# Patient Record
Sex: MALE | Race: WHITE
[De-identification: names, ages, dates, MRNs, and addresses within clinical notes are randomized per-mention and may not be internally consistent; named-entity substitution may affect disease eponyms.]

---

## 2018-10-01 ENCOUNTER — HOSPITAL ENCOUNTER (EMERGENCY)
Dept: HOSPITAL 62 - ER | Age: 63
Discharge: HOME | End: 2018-10-01
Payer: MEDICARE

## 2018-10-01 VITALS — DIASTOLIC BLOOD PRESSURE: 73 MMHG | SYSTOLIC BLOOD PRESSURE: 137 MMHG

## 2018-10-01 DIAGNOSIS — Z88.5: ICD-10-CM

## 2018-10-01 DIAGNOSIS — N13.2: Primary | ICD-10-CM

## 2018-10-01 DIAGNOSIS — R31.9: ICD-10-CM

## 2018-10-01 DIAGNOSIS — I10: ICD-10-CM

## 2018-10-01 DIAGNOSIS — R11.0: ICD-10-CM

## 2018-10-01 LAB
ADD MANUAL DIFF: NO
ALBUMIN SERPL-MCNC: 4.4 G/DL (ref 3.5–5)
ALP SERPL-CCNC: 102 U/L (ref 38–126)
ALT SERPL-CCNC: 31 U/L (ref 21–72)
ANION GAP SERPL CALC-SCNC: 11 MMOL/L (ref 5–19)
APPEARANCE UR: (no result)
APTT PPP: YELLOW S
AST SERPL-CCNC: 25 U/L (ref 17–59)
BASOPHILS # BLD AUTO: 0.1 10^3/UL (ref 0–0.2)
BASOPHILS NFR BLD AUTO: 0.5 % (ref 0–2)
BILIRUB DIRECT SERPL-MCNC: 0.5 MG/DL (ref 0–0.4)
BILIRUB SERPL-MCNC: 0.8 MG/DL (ref 0.2–1.3)
BILIRUB UR QL STRIP: NEGATIVE
BUN SERPL-MCNC: 19 MG/DL (ref 7–20)
CALCIUM: 9.9 MG/DL (ref 8.4–10.2)
CHLORIDE SERPL-SCNC: 105 MMOL/L (ref 98–107)
CO2 SERPL-SCNC: 23 MMOL/L (ref 22–30)
EOSINOPHIL # BLD AUTO: 0 10^3/UL (ref 0–0.6)
EOSINOPHIL NFR BLD AUTO: 0.5 % (ref 0–6)
ERYTHROCYTE [DISTWIDTH] IN BLOOD BY AUTOMATED COUNT: 14.3 % (ref 11.5–14)
GLUCOSE SERPL-MCNC: 244 MG/DL (ref 75–110)
GLUCOSE UR STRIP-MCNC: 150 MG/DL
HCT VFR BLD CALC: 43.8 % (ref 37.9–51)
HGB BLD-MCNC: 14.7 G/DL (ref 13.5–17)
KETONES UR STRIP-MCNC: 80 MG/DL
LIPASE SERPL-CCNC: 64.3 U/L (ref 23–300)
LYMPHOCYTES # BLD AUTO: 1 10^3/UL (ref 0.5–4.7)
LYMPHOCYTES NFR BLD AUTO: 10.3 % (ref 13–45)
MCH RBC QN AUTO: 27.3 PG (ref 27–33.4)
MCHC RBC AUTO-ENTMCNC: 33.4 G/DL (ref 32–36)
MCV RBC AUTO: 82 FL (ref 80–97)
MONOCYTES # BLD AUTO: 0.5 10^3/UL (ref 0.1–1.4)
MONOCYTES NFR BLD AUTO: 5.1 % (ref 3–13)
NEUTROPHILS # BLD AUTO: 8.5 10^3/UL (ref 1.7–8.2)
NEUTS SEG NFR BLD AUTO: 83.6 % (ref 42–78)
NITRITE UR QL STRIP: NEGATIVE
PH UR STRIP: 6 [PH] (ref 5–9)
PLATELET # BLD: 168 10^3/UL (ref 150–450)
POTASSIUM SERPL-SCNC: 4.6 MMOL/L (ref 3.6–5)
PROT SERPL-MCNC: 7.3 G/DL (ref 6.3–8.2)
PROT UR STRIP-MCNC: 30 MG/DL
RBC # BLD AUTO: 5.37 10^6/UL (ref 4.35–5.55)
SODIUM SERPL-SCNC: 139.3 MMOL/L (ref 137–145)
SP GR UR STRIP: 1.02
TOTAL CELLS COUNTED % (AUTO): 100 %
UROBILINOGEN UR-MCNC: NEGATIVE MG/DL (ref ?–2)
WBC # BLD AUTO: 10.2 10^3/UL (ref 4–10.5)

## 2018-10-01 PROCEDURE — 96375 TX/PRO/DX INJ NEW DRUG ADDON: CPT

## 2018-10-01 PROCEDURE — 87086 URINE CULTURE/COLONY COUNT: CPT

## 2018-10-01 PROCEDURE — 81001 URINALYSIS AUTO W/SCOPE: CPT

## 2018-10-01 PROCEDURE — 80053 COMPREHEN METABOLIC PANEL: CPT

## 2018-10-01 PROCEDURE — 76380 CAT SCAN FOLLOW-UP STUDY: CPT

## 2018-10-01 PROCEDURE — 99284 EMERGENCY DEPT VISIT MOD MDM: CPT

## 2018-10-01 PROCEDURE — 83690 ASSAY OF LIPASE: CPT

## 2018-10-01 PROCEDURE — 96374 THER/PROPH/DIAG INJ IV PUSH: CPT

## 2018-10-01 PROCEDURE — 36415 COLL VENOUS BLD VENIPUNCTURE: CPT

## 2018-10-01 PROCEDURE — 85025 COMPLETE CBC W/AUTO DIFF WBC: CPT

## 2018-10-01 PROCEDURE — 96361 HYDRATE IV INFUSION ADD-ON: CPT

## 2018-10-01 PROCEDURE — 96376 TX/PRO/DX INJ SAME DRUG ADON: CPT

## 2018-10-01 NOTE — ER DOCUMENT REPORT
ED Medical Screen (RME)





- General


Chief Complaint: Possible Kidney Stone


Stated Complaint: LEFT SIDE FLANK PAIN


Time Seen by Provider: 10/01/18 01:33


Mode of Arrival: Wheelchair


Information source: Patient


Notes: 





Patient is a 63-year-old male who presents with chief complaint of sudden onset 

flank pain.  Patient reports the pain is located on the left side, started in 

his back and radiated to the left lower quadrant.  Patient reports history of 

kidney stones approximately 20 years ago, states this feels very similar.  

Patient reports associated blood in his urine with mild nausea.





Exam:


Tenderness to palpation to left lower quadrant and left flank.





I have greeted and performed a rapid initial assessment of this patient.  A 

comprehensive ED assessment and evaluation of the patient, analysis of test 

results and completion of the medical decision making process will be conducted 

by additional ED providers.  Dictation of this chart was performed using voice 

recognition software; therefore, there may be some unintended grammatical 

errors.


TRAVEL OUTSIDE OF THE [place] IN LAST 30 DAYS: No





- Related Data


Allergies/Adverse Reactions: 


 





codeine Allergy (Verified 10/01/18 00:43)


 











Physical Exam





- Vital signs


Vitals: 





 











Temp Pulse Resp BP Pulse Ox


 


 97.4 F   79   20   140/82 H  95 


 


 10/01/18 00:46  10/01/18 00:46  10/01/18 00:46  10/01/18 00:46  10/01/18 00:46














Course





- Vital Signs


Vital signs: 





 











Temp Pulse Resp BP Pulse Ox


 


 97.4 F   79   20   140/82 H  95 


 


 10/01/18 00:46  10/01/18 00:46  10/01/18 00:46  10/01/18 00:46  10/01/18 00:46














Doctor's Discharge





- Discharge


Referrals: 


CHAUNCEY HURT MD [Primary Care Provider] - Follow up as needed

## 2018-10-01 NOTE — ER DOCUMENT REPORT
ED General





- General


Chief Complaint: Possible Kidney Stone


Stated Complaint: LEFT SIDE FLANK PAIN


Time Seen by Provider: 10/01/18 01:33


Mode of Arrival: Wheelchair


Notes: 





Patient presents with sudden onset left flank pain.  Patient reports the pain 

started in his left flank and radiates down to his left groin.  Patient reports 

associated nausea but denies any vomiting.  Patient reports remote history of 

kidney stones.  Denies any other symptoms to include fever.


TRAVEL OUTSIDE OF THE [place] IN LAST 30 DAYS: No





- Related Data


Allergies/Adverse Reactions: 


 





codeine Allergy (Verified 10/01/18 00:43)


 











Past Medical History





- General


Information source: Patient





- Social History


Smoking Status: Never Smoker


Frequency of alcohol use: None


Drug Abuse: None


Family History: Reviewed & Not Pertinent





- Past Medical History


Cardiac Medical History: Reports: Hx Hypertension


Renal/ Medical History: Reports: Hx Kidney Stones


Surgical Hx: Negative





Review of Systems





- Review of Systems


Gastrointestinal: Nausea


Genitourinary: Flank pain - Left


-: Yes All other systems reviewed and negative





Physical Exam





- Vital signs


Vitals: 


 











Temp Pulse Resp BP Pulse Ox


 


 97.4 F   79   20   140/82 H  95 


 


 10/01/18 00:46  10/01/18 00:46  10/01/18 00:46  10/01/18 00:46  10/01/18 00:46














- Notes


Notes: 





PHYSICAL EXAMINATION:





GENERAL: Well-appearing, well-nourished and in no acute distress.





HEAD: Atraumatic, normocephalic.





EYES: Pupils equal round and reactive to light, extraocular movements intact, 

sclera anicteric, conjunctiva are normal.





ENT: Nares patent, oropharynx clear without exudates.  Moist mucous membranes.





NECK: Normal range of motion, supple without lymphadenopathy





LUNGS: Breath sounds clear to auscultation bilaterally and equal.  No wheezes 

rales or rhonchi.





HEART: Regular rate and rhythm without murmurs





ABDOMEN: Soft, nondistended abdomen.  No guarding, no rebound.  No masses 

appreciated.  Tenderness to palpation to left lower quadrant.





Genitourinary: Left CVA tenderness.





Musculoskeletal: Normal range of motion, no pitting or edema.  No cyanosis.





NEUROLOGICAL: Cranial nerves grossly intact.  Normal speech, normal gait.  

Normal sensory, motor exams 





PSYCH: Normal mood, normal affect.





SKIN: Warm, Dry, normal turgor, no rashes or lesions noted.





Course





- Re-evaluation


Re-evalutation: 


Vital signs stable.  Urinalysis with hematuria.  No leukocytosis on CBC.  2-3mm 

distal ureteral stone noted on CT with mild hydronephrosis.  Patient given IV 

fluids, antiemetics and analgesics and reports significant relief of his 

symptoms.





- Vital Signs


Vital signs: 


 











Temp Pulse Resp BP Pulse Ox


 


 98.3 F   84   19   137/73 H  98 


 


 10/01/18 06:51  10/01/18 06:51  10/01/18 06:51  10/01/18 06:51  10/01/18 06:51














- Laboratory


Result Diagrams: 


 10/01/18 01:47





 10/01/18 01:47


Laboratory results interpreted by me: 


 











  10/01/18 10/01/18 10/01/18





  01:47 01:47 03:36


 


RDW  14.3 H  


 


Seg Neutrophils %  83.6 H  


 


Lymphocytes %  10.3 L  


 


Absolute Neutrophils  8.5 H  


 


Glucose   244 H 


 


Direct Bilirubin   0.5 H 


 


Urine Protein    30 H


 


Urine Glucose (UA)    150 H


 


Urine Ketones    80 H


 


Urine Blood    LARGE H














Discharge





- Discharge


Clinical Impression: 


 Kidney stone





Condition: Stable


Disposition: HOME, SELF-CARE


Additional Instructions: 


KIDNEY STONE:


     You are passing or have passed a kidney stone.  These stones are usually 

due to increased calcium or uric acid concentrations in your urine.  Stones 

within the kidney itself are not painful.  The pain occurs as the stone leaves 

the kidney to pass down the long tube, called the ureter, leading to the 

bladder.


     If the stone is small, it will usually pass by itself.  Most patients can 

pass the stone at home.  You will usually receive medications for pain, nausea 

or vomiting, and sometimes a medication to assist in passing the kidney stone.  

However, if the pain is very severe or if vomiting prevents you from taking 

oral pain medications, you may need to return for further treatment.


     Drink three or four quarts of fluids per day.  You will be given pain 

medication (if needed) and urine strainers.  Strain all your urine to see if 

the stone passes.  If your doctor has asked you to bring the stone in for 

analysis, return with the stone once it has passed.


     Return if pain or vomiting become severe, if you develop a high fever, if 

you are unable to pass your urine, or if other unusual symptoms occur.





TORADOL INJECTION:


     You have been given an injection of ketorolac tromethamine (Toradol).  

This is an excellent, safe drug for pain control.  It also has potent 

antiinflammatory action.  You should have significant pain relief within about 

one hour.


     Toradol is not addicting and is non-sedating.  It does not interfere with 

driving or work.


     Call or return if you develop itching, hives, shortness of breath, or rash.





[place] MEDICATION:


     You have been given a medication to suppress nausea and vomiting. This 

type of medication can be given as a shot, pill, or suppository. It will 

usually last for many hours.  Pills and shots usually last six to eight hours, 

suppositories last about 12 hours.  For the typical illness, only one or two 

doses of the medication may be necessary.


     Mild lightheadedness may occur.  This type of medicine can cause 

drowsiness.  Do not drive or operate dangerous machinery while under its 

influence.  Do not mix with alcohol.


     See your doctor at once if you have muscle spasms or tightness, or 

uncontrollable motions (particularly of the neck, mouth, or jaw). Persistent 

vomiting or severe lightheadedness should also be evaluated by the physician.








FOLLOW-UP CARE:


If you have been referred to a physician for follow-up care, call the physician

s office for an appointment as you were instructed or within the next two days.

  If you experience worsening or a significant change in your symptoms, notify 

the physician immediately or return to the Emergency Department at any time for 

re-evaluation.





****You are passing a kidney stone.  The pain may last for several days.  Drink 

plenty of fluids.  Take the medications as prescribed.****


Prescriptions: 


Ketorolac Tromethamine [Toradol 10 mg Tablet] 10 mg PO Q6HP PRN #20 tablet


 PRN Reason: 


Ondansetron [Zofran Odt 4 mg Tablet] 1 - 2 tab PO Q4H PRN #15 tab.rapdis


 PRN Reason: For Nausea/Vomiting


Referrals: 


CINDY MENDOZA II, MD [[facility]] - Follow up as needed

## 2018-10-01 NOTE — RADIOLOGY REPORT (SQ)
CT abdomen and pelvis without contrast on 10/1/2018 at 3:43 AM 



CLINICAL INDICATION: Left-sided back pain



TECHNIQUE: Multiple axial images are obtained throughout the

abdomen and pelvis without the administration of contrast. This

exam was performed according to our departmental

dose-optimization program, which includes automated exposure

control, adjustment of the mA and/or kV according to patient size

and/or use of iterative reconstruction technique. 

Total DLP is 1099.83 mGy*cm.



COMPARISON: None



FINDINGS:

Abdomen: There is minimal bibasilar atelectasis. There is mild

left hydronephrosis and proximal hydroureter to the level of a

2-3 mm left proximal ureteral stone at the L4 vertebral body

level. There is mild left perinephric stranding related to the

obstruction. There are no other renal or ureteral stones. The

unenhanced solid abdominal organs are otherwise unremarkable.

Mild vascular calcifications are noted. There is no abdominal

adenopathy. There is a small umbilical hernia containing only

fat. There is diverticulosis. The abdominal portion of the GI

tract is otherwise unremarkable.



Pelvis: There is diverticulosis. There is no free fluid in the

pelvis. There is no pelvic adenopathy. The pelvic portion of the

GI tract including the appendix is unremarkable. Degenerative

changes are noted in the spine.



IMPRESSION:

1. Mild left hydronephrosis and proximal hydroureter to the level

of a 2-3 mm left proximal ureteral stone.

2. Diverticulosis.